# Patient Record
Sex: FEMALE | Race: WHITE | NOT HISPANIC OR LATINO | URBAN - METROPOLITAN AREA
[De-identification: names, ages, dates, MRNs, and addresses within clinical notes are randomized per-mention and may not be internally consistent; named-entity substitution may affect disease eponyms.]

---

## 2020-09-10 ENCOUNTER — OFFICE VISIT (OUTPATIENT)
Dept: URBAN - METROPOLITAN AREA CLINIC 60 | Facility: CLINIC | Age: 55
End: 2020-09-10

## 2020-10-05 ENCOUNTER — OFFICE VISIT (OUTPATIENT)
Dept: URBAN - METROPOLITAN AREA CLINIC 121 | Facility: CLINIC | Age: 55
End: 2020-10-05

## 2020-10-09 ENCOUNTER — OFFICE VISIT (OUTPATIENT)
Dept: URBAN - METROPOLITAN AREA CLINIC 63 | Facility: CLINIC | Age: 55
End: 2020-10-09

## 2020-10-15 ENCOUNTER — OFFICE VISIT (OUTPATIENT)
Dept: URBAN - METROPOLITAN AREA CLINIC 60 | Facility: CLINIC | Age: 55
End: 2020-10-15

## 2020-11-05 ENCOUNTER — OFFICE VISIT (OUTPATIENT)
Dept: URBAN - METROPOLITAN AREA SURGERY CENTER 4 | Facility: SURGERY CENTER | Age: 55
End: 2020-11-05

## 2020-11-19 ENCOUNTER — OFFICE VISIT (OUTPATIENT)
Dept: URBAN - METROPOLITAN AREA CLINIC 60 | Facility: CLINIC | Age: 55
End: 2020-11-19

## 2020-11-19 ENCOUNTER — OFFICE VISIT (OUTPATIENT)
Dept: URBAN - METROPOLITAN AREA SURGERY CENTER 4 | Facility: SURGERY CENTER | Age: 55
End: 2020-11-19

## 2020-12-03 ENCOUNTER — OFFICE VISIT (OUTPATIENT)
Dept: URBAN - METROPOLITAN AREA CLINIC 60 | Facility: CLINIC | Age: 55
End: 2020-12-03

## 2021-09-16 ENCOUNTER — OFFICE VISIT (OUTPATIENT)
Dept: URBAN - METROPOLITAN AREA CLINIC 63 | Facility: CLINIC | Age: 56
End: 2021-09-16

## 2021-09-16 ENCOUNTER — OFFICE VISIT (OUTPATIENT)
Dept: URBAN - METROPOLITAN AREA CLINIC 60 | Facility: CLINIC | Age: 56
End: 2021-09-16

## 2021-10-15 ENCOUNTER — OFFICE VISIT (OUTPATIENT)
Dept: URBAN - METROPOLITAN AREA CLINIC 63 | Facility: CLINIC | Age: 56
End: 2021-10-15

## 2022-07-09 ENCOUNTER — TELEPHONE ENCOUNTER (OUTPATIENT)
Dept: URBAN - METROPOLITAN AREA CLINIC 121 | Facility: CLINIC | Age: 57
End: 2022-07-09

## 2022-07-09 RX ORDER — NEBIVOLOL HYDROCHLORIDE 5 MG/1
TABLET ORAL ONCE A DAY
Refills: 0 | OUTPATIENT
Start: 2020-09-09 | End: 2021-09-16

## 2022-07-09 RX ORDER — L.ACID/L.RHAM/B.BREVE/S.THERM 3B CELL
TABLET,CHEWABLE ORAL ONCE A DAY
Refills: 0 | OUTPATIENT
Start: 2021-09-16 | End: 2021-10-15

## 2022-07-09 RX ORDER — FLUTICASONE PROPIONATE 50 UG/1
SPRAY, METERED NASAL ONCE A DAY
Refills: 0 | OUTPATIENT
Start: 2021-09-16 | End: 2021-10-15

## 2022-07-09 RX ORDER — FLUTICASONE PROPIONATE 50 UG/1
SPRAY, METERED NASAL
Refills: 0 | OUTPATIENT
Start: 2020-09-10 | End: 2021-09-16

## 2022-07-09 RX ORDER — MV-MIN/FOLIC/VIT K/LYCOP/COQ10 200-100MCG
CAPSULE ORAL ONCE A DAY
Refills: 0 | OUTPATIENT
Start: 2020-09-09 | End: 2021-09-16

## 2022-07-09 RX ORDER — LISINOPRIL 20 MG/1
TABLET ORAL ONCE A DAY
Refills: 0 | OUTPATIENT
Start: 2021-09-16 | End: 2021-10-15

## 2022-07-09 RX ORDER — CIPROFLOXACIN HCL 500 MG
TABLET ORAL TWICE A DAY
Refills: 0 | OUTPATIENT
Start: 2020-09-10 | End: 2021-09-16

## 2022-07-09 RX ORDER — METRONIDAZOLE 500 MG/1
TABLET ORAL THREE TIMES A DAY
Refills: 0 | OUTPATIENT
Start: 2020-09-10 | End: 2021-09-16

## 2022-07-09 RX ORDER — L.ACID/L.RHAM/B.BREVE/S.THERM 3B CELL
TABLET,CHEWABLE ORAL ONCE A DAY
Refills: 0 | OUTPATIENT
Start: 2020-09-09 | End: 2021-09-16

## 2022-07-10 ENCOUNTER — TELEPHONE ENCOUNTER (OUTPATIENT)
Dept: URBAN - METROPOLITAN AREA CLINIC 121 | Facility: CLINIC | Age: 57
End: 2022-07-10

## 2022-07-10 RX ORDER — L.ACID/L.RHAM/B.BREVE/S.THERM 3B CELL
TABLET,CHEWABLE ORAL ONCE A DAY
Refills: 0 | Status: ACTIVE | COMMUNITY
Start: 2021-10-15

## 2022-07-10 RX ORDER — CIPROFLOXACIN HYDROCHLORIDE 500 MG/1
TWICE A DAY TAKE ONE TAB, PO, X 4 DAYS TABLET, FILM COATED ORAL TWICE A DAY
Refills: 0 | Status: ACTIVE | COMMUNITY
Start: 2020-09-10

## 2022-07-10 RX ORDER — FLUTICASONE PROPIONATE 50 UG/1
SPRAY, METERED NASAL ONCE A DAY
Refills: 0 | Status: ACTIVE | COMMUNITY
Start: 2021-10-15

## 2022-07-10 RX ORDER — CIPROFLOXACIN HYDROCHLORIDE 500 MG/1
TWICE A DAY TABLET, FILM COATED ORAL TWICE A DAY
Refills: 0 | Status: ACTIVE | COMMUNITY
Start: 2021-06-21

## 2022-07-10 RX ORDER — METRONIDAZOLE 500 MG/1
THREE TIMES A DAY TABLET ORAL THREE TIMES A DAY
Refills: 0 | Status: ACTIVE | COMMUNITY
Start: 2021-06-21

## 2022-07-10 RX ORDER — AMOXICILLIN AND CLAVULANATE POTASSIUM 875; 125 MG/1; MG/1
TWICE A DAY, PO TABLET ORAL TWICE A DAY
Refills: 0 | Status: ACTIVE | COMMUNITY
Start: 2020-09-22

## 2022-07-10 RX ORDER — LISINOPRIL 20 MG/1
TABLET ORAL ONCE A DAY
Refills: 0 | Status: ACTIVE | COMMUNITY
Start: 2021-10-15

## 2022-07-10 RX ORDER — ONDANSETRON 4 MG/1
TAKE FIRST TABLET 20 MIN PRIOR TO STARTING BOWEL PREP, THEN SECOND TABLET 20 MIN PRIOR TO SECOND DOSE TABLET, ORALLY DISINTEGRATING ORAL TAKE AS DIRECTED
Refills: 0 | Status: ACTIVE | COMMUNITY
Start: 2020-09-10

## 2022-07-10 RX ORDER — AMOXICILLIN AND CLAVULANATE POTASSIUM 875; 125 MG/1; MG/1
TWICE A DAY, PO TABLET ORAL TWICE A DAY
Refills: 0 | Status: ACTIVE | COMMUNITY
Start: 2021-09-16

## 2022-07-10 RX ORDER — METRONIDAZOLE 500 MG/1
THREE TIMES A DAY, PO TABLET ORAL THREE TIMES A DAY
Refills: 0 | Status: ACTIVE | COMMUNITY
Start: 2020-09-10

## 2022-11-11 ENCOUNTER — TELEPHONE ENCOUNTER (OUTPATIENT)
Dept: URBAN - METROPOLITAN AREA CLINIC 63 | Facility: CLINIC | Age: 57
End: 2022-11-11

## 2022-11-11 RX ORDER — CIPROFLOXACIN HYDROCHLORIDE 500 MG/1
TWICE A DAY TABLET, FILM COATED ORAL TWICE A DAY
Refills: 0
Start: 2021-06-21

## 2022-11-11 RX ORDER — CIPROFLOXACIN 500 MG/1
1 TABLET TABLET, FILM COATED ORAL
Qty: 20 | OUTPATIENT
Start: 2022-11-11 | End: 2022-11-21

## 2022-11-11 RX ORDER — METRONIDAZOLE 500 MG/1
1 TABLET TABLET ORAL THREE TIMES A DAY
Qty: 30 | OUTPATIENT
Start: 2022-11-11 | End: 2022-11-21

## 2022-11-11 RX ORDER — METRONIDAZOLE 375 MG/1
2 CAPSULES CAPSULE ORAL
Qty: 30 | OUTPATIENT
Start: 2022-11-11 | End: 2022-11-16

## 2022-11-15 ENCOUNTER — LAB OUTSIDE AN ENCOUNTER (OUTPATIENT)
Dept: URBAN - METROPOLITAN AREA TELEHEALTH 1 | Facility: TELEHEALTH | Age: 57
End: 2022-11-15

## 2022-11-15 ENCOUNTER — OFFICE VISIT (OUTPATIENT)
Dept: URBAN - METROPOLITAN AREA TELEHEALTH 1 | Facility: TELEHEALTH | Age: 57
End: 2022-11-15
Payer: COMMERCIAL

## 2022-11-15 DIAGNOSIS — K57.32 DIVERTICULITIS OF LARGE INTESTINE, UNSPECIFIED BLEEDING STATUS, UNSPECIFIED COMPLICATION STATUS: ICD-10-CM

## 2022-11-15 PROCEDURE — 99214 OFFICE O/P EST MOD 30 MIN: CPT | Performed by: INTERNAL MEDICINE

## 2022-11-15 RX ORDER — METRONIDAZOLE 500 MG/1
THREE TIMES A DAY, PO TABLET ORAL THREE TIMES A DAY
Refills: 0 | COMMUNITY
Start: 2020-09-10

## 2022-11-15 RX ORDER — CYCLOBENZAPRINE HYDROCHLORIDE 5 MG/1
TABLET, FILM COATED ORAL
Qty: 30 TABLET | Status: ACTIVE | COMMUNITY

## 2022-11-15 RX ORDER — LISINOPRIL 20 MG/1
TABLET ORAL ONCE A DAY
Refills: 0 | COMMUNITY
Start: 2021-10-15

## 2022-11-15 RX ORDER — ONDANSETRON 4 MG/1
TAKE FIRST TABLET 20 MIN PRIOR TO STARTING BOWEL PREP, THEN SECOND TABLET 20 MIN PRIOR TO SECOND DOSE TABLET, ORALLY DISINTEGRATING ORAL TAKE AS DIRECTED
Refills: 0 | Status: DISCONTINUED | COMMUNITY
Start: 2020-09-10

## 2022-11-15 RX ORDER — METRONIDAZOLE 375 MG/1
2 CAPSULES CAPSULE ORAL
Qty: 30 | COMMUNITY
Start: 2022-11-11 | End: 2022-11-16

## 2022-11-15 RX ORDER — CIPROFLOXACIN HYDROCHLORIDE 500 MG/1
TWICE A DAY TABLET, FILM COATED ORAL TWICE A DAY
Refills: 0 | COMMUNITY
Start: 2021-06-21

## 2022-11-15 RX ORDER — AMOXICILLIN AND CLAVULANATE POTASSIUM 875; 125 MG/1; MG/1
TWICE A DAY, PO TABLET ORAL TWICE A DAY
Refills: 0 | Status: DISCONTINUED | COMMUNITY
Start: 2020-09-22

## 2022-11-15 RX ORDER — L.ACID/L.RHAM/B.BREVE/S.THERM 3B CELL
TABLET,CHEWABLE ORAL ONCE A DAY
Refills: 0 | COMMUNITY
Start: 2021-10-15

## 2022-11-15 RX ORDER — FLUTICASONE PROPIONATE 50 UG/1
SPRAY, METERED NASAL ONCE A DAY
Refills: 0 | COMMUNITY
Start: 2021-10-15

## 2022-11-15 NOTE — HPI-TODAY'S VISIT:
Diana is a pleasant 57-year-old woman with history of hypertension, hyperlipidemia, CHRISTINE, obesity, GERD, tonsillectomy.  She had formerly been under the care of Dr. Amato.  We had seen her in November 2020 for colonoscopy after a bout of diverticulitis which revealed pandiverticulosis but was otherwise unremarkable.  She does have a history of colonic adenomas on prior exams.  She called last week with what she described as a typical episode of diverticulitis and preferred not to wait to be seen to be prescribed antibiotics.  She was confident that this was similar to prior episodes.  She was placed on Cipro and Flagyl for 10-day course and arranged for follow-up on 11/14/2022. Today in follow-up Diana reports that she has had a rough weekend.  Her pain is about 50% better but Saturday she was not able to get up off the couch.  She has not been eating much.  She did have some scrambled eggs this morning and was able to go to work.  She is not having rectal bleeding.  Stool is liquid.  She is not vomiting.  She denies fever. We have discussed the role of a CT scan to rule out microperforation or abscess and given that she is not pain-free and sick and that she is significantly symptomatic I have recommended it and she is in agreement.  We will see her in follow-up in about a week.

## 2023-01-11 ENCOUNTER — TELEPHONE ENCOUNTER (OUTPATIENT)
Dept: URBAN - METROPOLITAN AREA CLINIC 7 | Facility: CLINIC | Age: 58
End: 2023-01-11

## 2023-01-11 RX ORDER — AMOXICILLIN AND CLAVULANATE POTASSIUM 875; 125 MG/1; MG/1
1 TABLET TABLET, FILM COATED ORAL
Qty: 28 TABLET | Refills: 0 | OUTPATIENT

## 2023-01-25 ENCOUNTER — OFFICE VISIT (OUTPATIENT)
Dept: URBAN - METROPOLITAN AREA CLINIC 63 | Facility: CLINIC | Age: 58
End: 2023-01-25

## 2023-01-25 ENCOUNTER — CLAIMS CREATED FROM THE CLAIM WINDOW (OUTPATIENT)
Dept: URBAN - METROPOLITAN AREA CLINIC 63 | Facility: CLINIC | Age: 58
End: 2023-01-25

## 2023-01-25 ENCOUNTER — CLAIMS CREATED FROM THE CLAIM WINDOW (OUTPATIENT)
Dept: URBAN - METROPOLITAN AREA CLINIC 63 | Facility: CLINIC | Age: 58
End: 2023-01-25
Payer: COMMERCIAL

## 2023-01-25 VITALS
OXYGEN SATURATION: 98 % | WEIGHT: 205 LBS | SYSTOLIC BLOOD PRESSURE: 118 MMHG | DIASTOLIC BLOOD PRESSURE: 60 MMHG | HEIGHT: 62 IN | BODY MASS INDEX: 37.73 KG/M2 | HEART RATE: 68 BPM | TEMPERATURE: 98.6 F

## 2023-01-25 DIAGNOSIS — K57.20 DIVERTICULITIS OF LARGE INTESTINE WITH PERFORATION WITHOUT ABSCESS OR BLEEDING: ICD-10-CM

## 2023-01-25 PROCEDURE — 99214 OFFICE O/P EST MOD 30 MIN: CPT | Performed by: NURSE PRACTITIONER

## 2023-01-25 RX ORDER — ONDANSETRON HYDROCHLORIDE 4 MG/1
1 TABLET TABLET, FILM COATED ORAL
Qty: 2 | Refills: 0 | OUTPATIENT
Start: 2023-01-25

## 2023-01-25 RX ORDER — LISINOPRIL 20 MG/1
TABLET ORAL ONCE A DAY
Refills: 0 | Status: ACTIVE | COMMUNITY
Start: 2021-10-15

## 2023-01-25 RX ORDER — L.ACID/L.RHAM/B.BREVE/S.THERM 3B CELL
TABLET,CHEWABLE ORAL ONCE A DAY
Refills: 0 | Status: ACTIVE | COMMUNITY
Start: 2021-10-15

## 2023-01-25 RX ORDER — FLUTICASONE PROPIONATE 50 UG/1
SPRAY, METERED NASAL ONCE A DAY
Refills: 0 | Status: ACTIVE | COMMUNITY
Start: 2021-10-15

## 2023-01-25 NOTE — HPI-PREVIOUS LABS
Lab work dated 12 August 2020 demonstrates the following abnormalities: Cholesterol 203, triglycerides 169, glucose 104, hemoglobin A1c 6.0%.  All remaining lab values of CBC, CMP, and lipid panel are within normal limits.

## 2023-01-25 NOTE — HPI-TODAY'S VISIT:
Thank you very much for kindly referring Diana Lomeli, very pleasant 57-year-old female, back to our service due to diverticulitis.  Past medical history significant for HTN, HLD, anxiety, obesity, GERD, colon polyps and diverticulosis.  Past surgical history significant for tonsillectomy.  Her last complete colonoscopy was 19 November 2020 when she relates 2 episodes of probable diverticulitis since November 2022 consisting of Cipro/Flagyl x10 days and then a month later, Augmentin x14 days, which she has 2 days remaining.  Diaan presents today complaining of left lower quadrant "twinges" that are very intermittent and also relates that she is somewhat constipated now (Power 1), but is also eating substantially less due to the discomfort that she was previously experiencing.  She does use Benefiber once a day and stool softeners in the evening but is otherwise asymptomatic from a GI perspective.  She denies pyrosis, dysphagia, dyspepsia, persistent abdominal pain, rectal bleeding, melena or unintentional weight loss.

## 2023-01-25 NOTE — HPI-PREVIOUS IMAGING
CT abdomen pelvis with and without contrast/12 December 2022.  1.  No acute intra-abdominal or pelvic abnormality.  Previous site of diverticulitis has resolved.  2.  Mild hepatic steatosis and mild hepatomegaly.  FibroScan suggested for further evaluation. ********** CT abdomen and pelvis with contrast/23 July 2020.  1.  Diverticulitis of the descending colon.  No free air or drainable fluid collection. ********** CT abdomen and pelvis with and without contrast/07 February 2012.  1.  Mild diverticulosis and findings consistent with mild/early diverticulitis at the distal descending colon.  No evidence of bowel perforation or abscess.  2.  1.1 cm low-density hepatic lesion, likely a small hepatic cyst.

## 2023-01-25 NOTE — HPI-PREVIOUS PROCEDURES
Colonoscopy/19 November 2020.  Pandiverticulosis with internal hemorrhoids present.  The examination was otherwise normal on direct and retroflexion views.  No specimens collected.  Recommend repeat colonoscopy in 5 years due to personal history of adenomatous polyps. ********** EGD/27 October 2013 (Roni Benjamin MD).  1.  No clear explanation for patient's intractable nausea and occasional globus sensation/dysphagia.  2.  Chronic esophagitis by endoscopic criteria.  Pathology demonstrates gastric mucosa with no pathologic features at the GE junction biopsy.  All remaining findings are negative or benign. ********** Colonoscopy/05 March 2012 (Roni Benjamin MD).  1.  Two polyps, both subcentimeter, I doubt advanced adenomas or doubt as a potential for an obstructing diverticulum.  2.  Left colonic diverticulosis, including one with embedded fecalith.  3.  Internal hemorrhoids, reportedly asymptomatic.  Pathology demonstrates hyperplastic rectosigmoid colon polyp and possible tubular adenoma colon polyp with ulceration and severe acute and chronic inflammation at 30 cm proximal to the anus.  All remaining findings are negative or benign.

## 2023-02-01 ENCOUNTER — LAB OUTSIDE AN ENCOUNTER (OUTPATIENT)
Dept: URBAN - METROPOLITAN AREA CLINIC 63 | Facility: CLINIC | Age: 58
End: 2023-02-01

## 2023-03-01 PROBLEM — 4494009: Status: ACTIVE | Noted: 2023-01-25

## 2023-04-05 ENCOUNTER — WEB ENCOUNTER (OUTPATIENT)
Dept: URBAN - METROPOLITAN AREA SURGERY CENTER 4 | Facility: SURGERY CENTER | Age: 58
End: 2023-04-05

## 2023-04-07 ENCOUNTER — OFFICE VISIT (OUTPATIENT)
Dept: URBAN - METROPOLITAN AREA SURGERY CENTER 4 | Facility: SURGERY CENTER | Age: 58
End: 2023-04-07
Payer: COMMERCIAL

## 2023-04-07 DIAGNOSIS — K64.1 GRADE II HEMORRHOIDS: ICD-10-CM

## 2023-04-07 DIAGNOSIS — K57.30 DIVERTCULOSIS OF LG INT W/O PERFORATION OR ABSCESS W/O BLEEDING: ICD-10-CM

## 2023-04-07 DIAGNOSIS — R93.3 ABN FINDINGS-GI TRACT: ICD-10-CM

## 2023-04-07 DIAGNOSIS — K57.32 DIVERTICULITIS OF LARGE INTESTINE WITHOUT BLEEDING, UNSPECIFIED COMPLICATION STATUS: ICD-10-CM

## 2023-04-07 PROCEDURE — 45378 DIAGNOSTIC COLONOSCOPY: CPT | Performed by: INTERNAL MEDICINE

## 2023-04-07 RX ORDER — ONDANSETRON HYDROCHLORIDE 4 MG/1
1 TABLET TABLET, FILM COATED ORAL
Qty: 2 | Refills: 0 | Status: ACTIVE | COMMUNITY
Start: 2023-01-25

## 2023-04-07 RX ORDER — L.ACID/L.RHAM/B.BREVE/S.THERM 3B CELL
TABLET,CHEWABLE ORAL ONCE A DAY
Refills: 0 | Status: ACTIVE | COMMUNITY
Start: 2021-10-15

## 2023-04-07 RX ORDER — FLUTICASONE PROPIONATE 50 UG/1
SPRAY, METERED NASAL ONCE A DAY
Refills: 0 | Status: ACTIVE | COMMUNITY
Start: 2021-10-15

## 2023-04-07 RX ORDER — LISINOPRIL 20 MG/1
TABLET ORAL ONCE A DAY
Refills: 0 | Status: ACTIVE | COMMUNITY
Start: 2021-10-15

## 2023-04-28 ENCOUNTER — OFFICE VISIT (OUTPATIENT)
Dept: URBAN - METROPOLITAN AREA CLINIC 63 | Facility: CLINIC | Age: 58
End: 2023-04-28

## 2023-05-12 ENCOUNTER — TELEPHONE ENCOUNTER (OUTPATIENT)
Dept: URBAN - METROPOLITAN AREA CLINIC 60 | Facility: CLINIC | Age: 58
End: 2023-05-12

## 2023-05-12 RX ORDER — METRONIDAZOLE 500 MG/1
1 TABLET TABLET ORAL THREE TIMES A DAY
Qty: 42 TABLET | Refills: 0 | OUTPATIENT
Start: 2023-05-12 | End: 2023-05-26

## 2023-05-12 RX ORDER — CIPROFLOXACIN HYDROCHLORIDE 500 MG/1
1 TABLET TABLET, FILM COATED ORAL
Qty: 28 TABLET | Refills: 0 | OUTPATIENT
Start: 2023-05-12 | End: 2023-05-26

## 2023-06-01 ENCOUNTER — TELEPHONE ENCOUNTER (OUTPATIENT)
Dept: URBAN - METROPOLITAN AREA CLINIC 63 | Facility: CLINIC | Age: 58
End: 2023-06-01

## 2023-06-01 ENCOUNTER — LAB OUTSIDE AN ENCOUNTER (OUTPATIENT)
Dept: URBAN - METROPOLITAN AREA CLINIC 63 | Facility: CLINIC | Age: 58
End: 2023-06-01

## 2023-06-01 RX ORDER — L.ACID/L.RHAM/B.BREVE/S.THERM 3B CELL
TABLET,CHEWABLE ORAL ONCE A DAY
Refills: 0 | Status: ACTIVE | COMMUNITY
Start: 2021-10-15

## 2023-06-01 RX ORDER — FLUTICASONE PROPIONATE 50 UG/1
SPRAY, METERED NASAL ONCE A DAY
Refills: 0 | Status: ACTIVE | COMMUNITY
Start: 2021-10-15

## 2023-06-01 RX ORDER — ONDANSETRON HYDROCHLORIDE 4 MG/1
1 TABLET TABLET, FILM COATED ORAL
Qty: 2 | Refills: 0 | Status: ACTIVE | COMMUNITY
Start: 2023-01-25

## 2023-06-01 RX ORDER — LISINOPRIL 20 MG/1
TABLET ORAL ONCE A DAY
Refills: 0 | Status: ACTIVE | COMMUNITY
Start: 2021-10-15

## 2023-06-01 RX ORDER — AMOXICILLIN AND CLAVULANATE POTASSIUM 875; 125 MG/1; MG/1
1 TABLET TABLET, FILM COATED ORAL
Qty: 28 TABLET | OUTPATIENT
Start: 2023-06-01 | End: 2023-06-15

## 2023-06-08 ENCOUNTER — WEB ENCOUNTER (OUTPATIENT)
Dept: URBAN - METROPOLITAN AREA CLINIC 63 | Facility: CLINIC | Age: 58
End: 2023-06-08

## 2023-06-12 ENCOUNTER — DASHBOARD ENCOUNTERS (OUTPATIENT)
Age: 58
End: 2023-06-12

## 2023-06-12 ENCOUNTER — OFFICE VISIT (OUTPATIENT)
Dept: URBAN - METROPOLITAN AREA CLINIC 63 | Facility: CLINIC | Age: 58
End: 2023-06-12
Payer: COMMERCIAL

## 2023-06-12 VITALS
SYSTOLIC BLOOD PRESSURE: 120 MMHG | HEIGHT: 62 IN | WEIGHT: 208.6 LBS | BODY MASS INDEX: 38.39 KG/M2 | DIASTOLIC BLOOD PRESSURE: 72 MMHG | OXYGEN SATURATION: 97 % | TEMPERATURE: 98.5 F | HEART RATE: 70 BPM

## 2023-06-12 DIAGNOSIS — K57.32 DIVERTICULITIS OF DESCENDING COLON: ICD-10-CM

## 2023-06-12 PROBLEM — 1172640002: Status: ACTIVE | Noted: 2023-06-12

## 2023-06-12 PROCEDURE — 99214 OFFICE O/P EST MOD 30 MIN: CPT | Performed by: NURSE PRACTITIONER

## 2023-06-12 RX ORDER — L.ACID/L.RHAM/B.BREVE/S.THERM 3B CELL
TABLET,CHEWABLE ORAL ONCE A DAY
Refills: 0 | Status: ACTIVE | COMMUNITY
Start: 2021-10-15

## 2023-06-12 RX ORDER — LISINOPRIL 20 MG/1
TABLET ORAL ONCE A DAY
Refills: 0 | Status: ACTIVE | COMMUNITY
Start: 2021-10-15

## 2023-06-12 RX ORDER — FLUTICASONE PROPIONATE 50 UG/1
SPRAY, METERED NASAL ONCE A DAY
Refills: 0 | Status: ACTIVE | COMMUNITY
Start: 2021-10-15

## 2023-06-12 RX ORDER — AMOXICILLIN AND CLAVULANATE POTASSIUM 875; 125 MG/1; MG/1
1 TABLET TABLET, FILM COATED ORAL
Qty: 28 TABLET | Status: ACTIVE | COMMUNITY
Start: 2023-06-01 | End: 2023-06-15

## 2023-06-12 NOTE — HPI-PREVIOUS IMAGING
CT abdomen and pelvis with contrast/01 June 2023.  Acute diverticulitis at the junction of the descending and sigmoid colon without focal abscess or free air. ********** CT abdomen pelvis with and without contrast/12 December 2022.  1.  No acute intra-abdominal or pelvic abnormality.  Previous site of diverticulitis has resolved.  2.  Mild hepatic steatosis and mild hepatomegaly.  FibroScan suggested for further evaluation. ********** CT abdomen and pelvis with contrast/23 July 2020.  1.  Diverticulitis of the descending colon.  No free air or drainable fluid collection. ********** CT abdomen and pelvis with and without contrast/07 February 2012.  1.  Mild diverticulosis and findings consistent with mild/early diverticulitis at the distal descending colon.  No evidence of bowel perforation or abscess.  2.  1.1 cm low-density hepatic lesion, likely a small hepatic cyst.

## 2023-06-12 NOTE — HPI-PREVIOUS PROCEDURES
Colonoscopy/07 April 2023.  Pandiverticulosis with internal hemorrhoids present.  The examination was otherwise normal on direct and retroflexion views.  No specimens collected.  Recommend repeat colonoscopy in 10 years for colorectal cancer screening purposes. ********** Colonoscopy/19 November 2020.  Pandiverticulosis with internal hemorrhoids present.  The examination was otherwise normal on direct and retroflexion views.  No specimens collected.  Recommend repeat colonoscopy in 5 years due to personal history of adenomatous polyps. ********** EGD/27 October 2013 (Roni Benjamin MD).  1.  No clear explanation for patient's intractable nausea and occasional globus sensation/dysphagia.  2.  Chronic esophagitis by endoscopic criteria.  Pathology demonstrates gastric mucosa with no pathologic features at the GE junction biopsy.  All remaining findings are negative or benign. ********** Colonoscopy/05 March 2012 (Roni Benjamin MD).  1.  Two polyps, both subcentimeter, I doubt advanced adenomas or doubt as a potential for an obstructing diverticulum.  2.  Left colonic diverticulosis, including one with embedded fecalith.  3.  Internal hemorrhoids, reportedly asymptomatic.  Pathology demonstrates hyperplastic rectosigmoid colon polyp and possible tubular adenoma colon polyp with ulceration and severe acute and chronic inflammation at 30 cm proximal to the anus.  All remaining findings are negative or benign.

## 2023-06-12 NOTE — HPI-TODAY'S VISIT:
Diana Lomeli is a very pleasant 57-year-old female seen in follow-up of surveillance colonoscopy and recurrent acute descending/sigmoid diverticulitis.  Past medical history is significant for hypertension, hyperlipidemia, anxiety, obesity, GERD, colon polyps and diverticulosis.  Past surgical history is significant for left knee meniscus and tonsillectomy.  Her most recent colonoscopy was 07 April 2023 and was significant for pandiverticulosis and internal hemorrhoids.  Diana presents today feeling well and admits to tolerating the colonoscopy very easily without any postprocedure complications.  She called our service on 27 May 2023 with recurrent left lower quadrant abdominal pain and CT scan dated 01 June 2023 demonstrates acute diverticulitis at the junction of the descending and sigmoid colon without focal abscess or free air.  I prescribed Augmentin, 875-125 mg, twice daily for 14 days which was started on 03 June 2023.  She currently has 4 days remaining and relates 1 bowel movement per day or every other day of soft stool.  She is still trying to adhere to a soft diet and is very concerned about an upcoming trip to [place] with her family.  She is otherwise asymptomatic from a GI perspective and denies pyrosis, dysphagia, dyspepsia, abdominal pain, blood per rectum, melena or unintentional weight loss.

## 2023-06-20 ENCOUNTER — TELEPHONE ENCOUNTER (OUTPATIENT)
Dept: URBAN - METROPOLITAN AREA CLINIC 63 | Facility: CLINIC | Age: 58
End: 2023-06-20

## 2024-07-08 ENCOUNTER — TELEPHONE ENCOUNTER (OUTPATIENT)
Dept: URBAN - METROPOLITAN AREA CLINIC 63 | Facility: CLINIC | Age: 59
End: 2024-07-08

## 2024-07-09 ENCOUNTER — TELEPHONE ENCOUNTER (OUTPATIENT)
Dept: URBAN - METROPOLITAN AREA CLINIC 63 | Facility: CLINIC | Age: 59
End: 2024-07-09

## 2024-08-20 ENCOUNTER — OFFICE VISIT (OUTPATIENT)
Dept: URBAN - METROPOLITAN AREA CLINIC 60 | Facility: CLINIC | Age: 59
End: 2024-08-20
Payer: COMMERCIAL

## 2024-08-20 VITALS
OXYGEN SATURATION: 96 % | HEIGHT: 62 IN | BODY MASS INDEX: 38.24 KG/M2 | HEART RATE: 70 BPM | RESPIRATION RATE: 12 BRPM | TEMPERATURE: 98.2 F | DIASTOLIC BLOOD PRESSURE: 84 MMHG | WEIGHT: 207.8 LBS | SYSTOLIC BLOOD PRESSURE: 136 MMHG

## 2024-08-20 DIAGNOSIS — K57.32 DIVERTICULITIS OF DESCENDING COLON: ICD-10-CM

## 2024-08-20 DIAGNOSIS — K76.0 FATTY INFILTRATION OF LIVER: ICD-10-CM

## 2024-08-20 DIAGNOSIS — E66.3 OVERWEIGHT: ICD-10-CM

## 2024-08-20 PROBLEM — 197321007: Status: ACTIVE | Noted: 2024-08-20

## 2024-08-20 PROBLEM — 238131007: Status: ACTIVE | Noted: 2024-08-20

## 2024-08-20 PROCEDURE — 99214 OFFICE O/P EST MOD 30 MIN: CPT | Performed by: INTERNAL MEDICINE

## 2024-08-20 RX ORDER — LISINOPRIL 20 MG/1
TABLET ORAL ONCE A DAY
Refills: 0 | Status: ACTIVE | COMMUNITY
Start: 2021-10-15

## 2024-08-20 RX ORDER — L.ACID/L.RHAM/B.BREVE/S.THERM 3B CELL
TABLET,CHEWABLE ORAL ONCE A DAY
Refills: 0 | Status: ACTIVE | COMMUNITY
Start: 2021-10-15

## 2024-08-20 RX ORDER — FLUTICASONE PROPIONATE 50 UG/1
SPRAY, METERED NASAL ONCE A DAY
Refills: 0 | Status: ACTIVE | COMMUNITY
Start: 2021-10-15

## 2024-08-20 RX ORDER — POLYETHYLENE GLYCOL 3350 17 G/17G
1 SCOOP MIXED WITH 8 OUNCES OF FLUID POWDER, FOR SOLUTION ORAL ONCE A DAY
Status: ACTIVE | COMMUNITY

## 2024-08-20 NOTE — PHYSICAL EXAM GASTROINTESTINAL
: Abdomen; soft, nontender, nondistended , no guarding or rigidity, no masses palpable , normal bowel sounds.  Liver and Spleen;LIVER FELT 3 CM BELOW RCM

## 2024-08-20 NOTE — HPI-TODAY'S VISIT:
Manuela is a pleasant 59-year-old female previously seen by my associate Dr. Thrasher is here to establish care with me as she comes to the [facility] office. Reviewed her records. History of recurrent diverticulitis. She has been referred to Dr. Thompson by Dr. Bryn sheffield as practitioner JR. She has an appointment to see Dr. Thompson next week. Her last diverticulitis flare documented on CAT scan was a year ago in summer responded to bowel rest and antibiotics. This year in July she had another bout of abdominal pain left sided. Presumed to be diverticulitis and treated with bowel rest and antibiotics and responded. She is currently asymptomatic. She denies any reflux or dysphagia abdominal pain or nausea. Denies any bloating, constipation, diarrhea, rectal bleeding or melena or any unintentional weight loss or loss of appetite. Last colonoscopy was done in April 2023 and she was given a 10-year recall. No recent labs to review. She is scheduled to have her labs drawn in 2 to 3 weeks. Denies any NSAID use. Occasionally takes Excedrin when she has a headache. Imaging studies have also noted mild hepatic steatosis and mild hepatomegaly. She is not a diabetic and has 1-2 alcoholic beverages per week. Previous labs are noted mild elevation of transaminases.   Past medical history is significant for hypertension, hyperlipidemia, anxiety, obesity, GERD, colon polyps and diverticulosis.  Past surgical history is significant for left knee meniscus and tonsillectomy.  Her most recent colonoscopy was 07 April 2023 and was significant for pandiverticulosis and internal hemorrhoids.

## 2024-08-27 ENCOUNTER — LAB OUTSIDE AN ENCOUNTER (OUTPATIENT)
Dept: URBAN - METROPOLITAN AREA CLINIC 60 | Facility: CLINIC | Age: 59
End: 2024-08-27

## 2024-09-24 ENCOUNTER — LAB OUTSIDE AN ENCOUNTER (OUTPATIENT)
Dept: URBAN - METROPOLITAN AREA CLINIC 63 | Facility: CLINIC | Age: 59
End: 2024-09-24

## 2024-10-18 ENCOUNTER — OFFICE VISIT (OUTPATIENT)
Dept: URBAN - METROPOLITAN AREA CLINIC 63 | Facility: CLINIC | Age: 59
End: 2024-10-18

## 2025-02-11 ENCOUNTER — OFFICE VISIT (OUTPATIENT)
Dept: URBAN - METROPOLITAN AREA CLINIC 60 | Facility: CLINIC | Age: 60
End: 2025-02-11

## 2025-02-14 ENCOUNTER — OFFICE VISIT (OUTPATIENT)
Dept: URBAN - METROPOLITAN AREA CLINIC 63 | Facility: CLINIC | Age: 60
End: 2025-02-14
Payer: COMMERCIAL

## 2025-02-14 VITALS
WEIGHT: 209 LBS | BODY MASS INDEX: 38.46 KG/M2 | SYSTOLIC BLOOD PRESSURE: 141 MMHG | DIASTOLIC BLOOD PRESSURE: 83 MMHG | HEART RATE: 52 BPM | OXYGEN SATURATION: 97 % | TEMPERATURE: 98.7 F | HEIGHT: 62 IN

## 2025-02-14 DIAGNOSIS — K80.20 GALLSTONES: ICD-10-CM

## 2025-02-14 DIAGNOSIS — K57.32 DIVERTICULITIS OF DESCENDING COLON: ICD-10-CM

## 2025-02-14 DIAGNOSIS — K76.0 FATTY INFILTRATION OF LIVER: ICD-10-CM

## 2025-02-14 DIAGNOSIS — E66.3 OVERWEIGHT: ICD-10-CM

## 2025-02-14 PROCEDURE — 99214 OFFICE O/P EST MOD 30 MIN: CPT | Performed by: INTERNAL MEDICINE

## 2025-02-14 RX ORDER — LISINOPRIL 20 MG/1
TABLET ORAL ONCE A DAY
Refills: 0 | Status: ACTIVE | COMMUNITY
Start: 2021-10-15

## 2025-02-14 RX ORDER — POLYETHYLENE GLYCOL 3350 17 G/DOSE
1 SCOOP MIXED WITH 8 OUNCES OF FLUID POWDER (GRAM) ORAL ONCE A DAY
Status: ACTIVE | COMMUNITY

## 2025-02-14 RX ORDER — L.ACID/L.RHAM/B.BREVE/S.THERM 3B CELL
TABLET,CHEWABLE ORAL ONCE A DAY
Refills: 0 | Status: ACTIVE | COMMUNITY
Start: 2021-10-15

## 2025-02-14 RX ORDER — FLUTICASONE PROPIONATE 50 UG/1
SPRAY, METERED NASAL ONCE A DAY
Refills: 0 | Status: ACTIVE | COMMUNITY
Start: 2021-10-15

## 2025-02-14 NOTE — HPI-PREVIOUS IMAGING
FibroScan transient elastography September 2024: S0/F0F0 Abdominal ultrasound September 2024: Liver is normal in size, hyperechoic echogenicity, normal contour, 2.6 x 2.1 cm right hepatic lobe cyst, normal spleen, cholelithiasis and findings suggestive of adenomyomatosis at the fundus   CT abdomen and pelvis with contrast/01 June 2023.  Acute diverticulitis at the junction of the descending and sigmoid colon without focal abscess or free air. ********** CT abdomen pelvis with and without contrast/12 December 2022.  1.  No acute intra-abdominal or pelvic abnormality.  Previous site of diverticulitis has resolved.  2.  Mild hepatic steatosis and mild hepatomegaly.  FibroScan suggested for further evaluation. ********** CT abdomen and pelvis with contrast/23 July 2020.  1.  Diverticulitis of the descending colon.  No free air or drainable fluid collection. ********** CT abdomen and pelvis with and without contrast/07 February 2012.  1.  Mild diverticulosis and findings consistent with mild/early diverticulitis at the distal descending colon.  No evidence of bowel perforation or abscess.  2.  1.1 cm low-density hepatic lesion, likely a small hepatic cyst.

## 2025-02-14 NOTE — HPI-PREVIOUS LABS
Labs September 2024: Normal lipid panel except for LDL of 137 (H), hemoglobin A1c 6.0 (H), WBC 5.8, hemoglobin 14.2 g MCV 91, platelets 227 normal renal function, normal electrolytes, normal liver enzymes, serum albumin 4.5,  Lab work dated 12 August 2020 demonstrates the following abnormalities: Cholesterol 203, triglycerides 169, glucose 104, hemoglobin A1c 6.0%.  All remaining lab values of CBC, CMP, and lipid panel are within normal limits.

## 2025-02-14 NOTE — HPI-TODAY'S VISIT:
She underwent robotic low anterior resection with splenic flexure mobilization done by Dr. Thompson in October 2024 for recurrent diverticulitis. Pathology noted acute diverticulitis with serosal perforation. There were no postop complications. Here in f/u Diana is here today in follow-up. He is asymptomatic. Since her surgery (segmental sigmoid resection-LAR) she has postprandial bowel movements 30 minutes after meal. Denies any diarrhea or rectal bleeding. She denies any abdominal pain, bloating, early satiety etc. Did not report any reflux or dysphagia. For her fatty liver disease she underwent an ultrasound in September that documented gallstones and adenomyomatosis and fatty liver without any focal lesions. FibroScan noted S0/F/0 She is overweight. She denies any history of diabetes. Denies hyperlipidemia. Walks on a regular basis. Has 2 glasses of wine per week Normal liver enzymes and serum albumin checked in September 2024 with a normal platelet count    LAST OFFICE VISIT : Manuela is a pleasant 59-year-old female previously seen by my associate Dr. Thrasher is here to establish care with me as she comes to the [facility] office. Reviewed her records. History of recurrent diverticulitis. She has been referred to Dr. Thompson by Dr. Thrasher's nurse practitioner JR. She has an appointment to see Dr. Thompson next week. Her last diverticulitis flare documented on CAT scan was a year ago in summer responded to bowel rest and antibiotics. Last colonoscopy was done in April 2023 and she was given a 10-year recall. Imaging studies have also noted mild hepatic steatosis and mild hepatomegaly. She is not a diabetic and has 1-2 alcoholic beverages per week. Previous labs are noted mild elevation of transaminases.   Past medical history is significant for hypertension, hyperlipidemia, anxiety, obesity, GERD, colon polyps and diverticulosis.  Past surgical history is significant for left knee meniscus and tonsillectomy.  Her most recent colonoscopy was 07 April 2023 and was significant for pandiverticulosis and internal hemorrhoids.

## 2025-08-07 ENCOUNTER — OFFICE VISIT (OUTPATIENT)
Dept: URBAN - METROPOLITAN AREA CLINIC 63 | Facility: CLINIC | Age: 60
End: 2025-08-07